# Patient Record
Sex: FEMALE | Race: WHITE | NOT HISPANIC OR LATINO | Employment: UNEMPLOYED | URBAN - METROPOLITAN AREA
[De-identification: names, ages, dates, MRNs, and addresses within clinical notes are randomized per-mention and may not be internally consistent; named-entity substitution may affect disease eponyms.]

---

## 2017-03-03 ENCOUNTER — HOSPITAL ENCOUNTER (EMERGENCY)
Facility: MEDICAL CENTER | Age: 22
End: 2017-03-03

## 2017-03-03 VITALS
TEMPERATURE: 99.1 F | DIASTOLIC BLOOD PRESSURE: 53 MMHG | HEIGHT: 63 IN | BODY MASS INDEX: 22.27 KG/M2 | HEART RATE: 96 BPM | RESPIRATION RATE: 16 BRPM | WEIGHT: 125.66 LBS | SYSTOLIC BLOOD PRESSURE: 112 MMHG | OXYGEN SATURATION: 100 %

## 2017-03-03 PROCEDURE — 302449 STATCHG TRIAGE ONLY (STATISTIC)

## 2017-03-03 ASSESSMENT — PAIN SCALES - GENERAL: PAINLEVEL_OUTOF10: 0

## 2017-03-03 NOTE — ED NOTES
C/o having seizure this morning, has a hx of same, has been on keppra in the past, but quit because it ' just made me feel down', does not want to take anticonvulsants, wants to get on medical marajuana fo, seizures increasing last keppra 5 years ago, is experiencing ringing in L ear, has used ativan because she gets auras and can premedicate

## 2017-07-26 ENCOUNTER — HOSPITAL ENCOUNTER (EMERGENCY)
Facility: MEDICAL CENTER | Age: 22
End: 2017-07-26
Attending: EMERGENCY MEDICINE
Payer: MEDICAID

## 2017-07-26 ENCOUNTER — APPOINTMENT (OUTPATIENT)
Dept: RADIOLOGY | Facility: MEDICAL CENTER | Age: 22
End: 2017-07-26
Attending: EMERGENCY MEDICINE
Payer: MEDICAID

## 2017-07-26 VITALS
HEIGHT: 64 IN | SYSTOLIC BLOOD PRESSURE: 124 MMHG | OXYGEN SATURATION: 99 % | TEMPERATURE: 97.9 F | DIASTOLIC BLOOD PRESSURE: 48 MMHG | HEART RATE: 62 BPM | WEIGHT: 120 LBS | BODY MASS INDEX: 20.49 KG/M2 | RESPIRATION RATE: 18 BRPM

## 2017-07-26 DIAGNOSIS — E87.6 HYPOKALEMIA: ICD-10-CM

## 2017-07-26 DIAGNOSIS — Z86.69 HISTORY OF SEIZURE DISORDER: ICD-10-CM

## 2017-07-26 DIAGNOSIS — N39.0 URINARY TRACT INFECTION WITHOUT HEMATURIA, SITE UNSPECIFIED: ICD-10-CM

## 2017-07-26 DIAGNOSIS — R56.9 SEIZURE (HCC): ICD-10-CM

## 2017-07-26 LAB
ALBUMIN SERPL BCP-MCNC: 3.8 G/DL (ref 3.2–4.9)
ALBUMIN/GLOB SERPL: 1.7 G/DL
ALP SERPL-CCNC: 43 U/L (ref 30–99)
ALT SERPL-CCNC: 8 U/L (ref 2–50)
ANION GAP SERPL CALC-SCNC: 5 MMOL/L (ref 0–11.9)
APPEARANCE UR: ABNORMAL
AST SERPL-CCNC: 11 U/L (ref 12–45)
BACTERIA #/AREA URNS HPF: ABNORMAL /HPF
BASOPHILS # BLD AUTO: 0.3 % (ref 0–1.8)
BASOPHILS # BLD: 0.02 K/UL (ref 0–0.12)
BILIRUB SERPL-MCNC: 0.4 MG/DL (ref 0.1–1.5)
BILIRUB UR QL STRIP.AUTO: NEGATIVE
BUN SERPL-MCNC: 15 MG/DL (ref 8–22)
CALCIUM SERPL-MCNC: 8.7 MG/DL (ref 8.5–10.5)
CHLORIDE SERPL-SCNC: 107 MMOL/L (ref 96–112)
CO2 SERPL-SCNC: 24 MMOL/L (ref 20–33)
COLOR UR: YELLOW
CREAT SERPL-MCNC: 0.6 MG/DL (ref 0.5–1.4)
CULTURE IF INDICATED INDCX: YES UA CULTURE
EOSINOPHIL # BLD AUTO: 0.29 K/UL (ref 0–0.51)
EOSINOPHIL NFR BLD: 4 % (ref 0–6.9)
EPI CELLS #/AREA URNS HPF: ABNORMAL /HPF
ERYTHROCYTE [DISTWIDTH] IN BLOOD BY AUTOMATED COUNT: 45 FL (ref 35.9–50)
GFR SERPL CREATININE-BSD FRML MDRD: >60 ML/MIN/1.73 M 2
GLOBULIN SER CALC-MCNC: 2.3 G/DL (ref 1.9–3.5)
GLUCOSE SERPL-MCNC: 85 MG/DL (ref 65–99)
GLUCOSE UR STRIP.AUTO-MCNC: NEGATIVE MG/DL
HCG UR QL: NEGATIVE
HCT VFR BLD AUTO: 37.7 % (ref 37–47)
HGB BLD-MCNC: 13 G/DL (ref 12–16)
HYALINE CASTS #/AREA URNS LPF: ABNORMAL /LPF
IMM GRANULOCYTES # BLD AUTO: 0.01 K/UL (ref 0–0.11)
IMM GRANULOCYTES NFR BLD AUTO: 0.1 % (ref 0–0.9)
KETONES UR STRIP.AUTO-MCNC: NEGATIVE MG/DL
LEUKOCYTE ESTERASE UR QL STRIP.AUTO: ABNORMAL
LYMPHOCYTES # BLD AUTO: 2.87 K/UL (ref 1–4.8)
LYMPHOCYTES NFR BLD: 39.7 % (ref 22–41)
MCH RBC QN AUTO: 33 PG (ref 27–33)
MCHC RBC AUTO-ENTMCNC: 34.5 G/DL (ref 33.6–35)
MCV RBC AUTO: 95.7 FL (ref 81.4–97.8)
MICRO URNS: ABNORMAL
MONOCYTES # BLD AUTO: 0.46 K/UL (ref 0–0.85)
MONOCYTES NFR BLD AUTO: 6.4 % (ref 0–13.4)
NEUTROPHILS # BLD AUTO: 3.58 K/UL (ref 2–7.15)
NEUTROPHILS NFR BLD: 49.5 % (ref 44–72)
NITRITE UR QL STRIP.AUTO: NEGATIVE
NRBC # BLD AUTO: 0 K/UL
NRBC BLD AUTO-RTO: 0 /100 WBC
PH UR STRIP.AUTO: 7.5 [PH]
PLATELET # BLD AUTO: 198 K/UL (ref 164–446)
PMV BLD AUTO: 10.9 FL (ref 9–12.9)
POTASSIUM SERPL-SCNC: 3.2 MMOL/L (ref 3.6–5.5)
PROT SERPL-MCNC: 6.1 G/DL (ref 6–8.2)
PROT UR QL STRIP: NEGATIVE MG/DL
RBC # BLD AUTO: 3.94 M/UL (ref 4.2–5.4)
RBC # URNS HPF: ABNORMAL /HPF
RBC UR QL AUTO: ABNORMAL
SODIUM SERPL-SCNC: 136 MMOL/L (ref 135–145)
SP GR UR REFRACTOMETRY: 1.01
SP GR UR STRIP.AUTO: 1.02
UROBILINOGEN UR STRIP.AUTO-MCNC: 1 MG/DL
WBC # BLD AUTO: 7.2 K/UL (ref 4.8–10.8)
WBC #/AREA URNS HPF: ABNORMAL /HPF

## 2017-07-26 PROCEDURE — 85025 COMPLETE CBC W/AUTO DIFF WBC: CPT

## 2017-07-26 PROCEDURE — 81001 URINALYSIS AUTO W/SCOPE: CPT

## 2017-07-26 PROCEDURE — A9270 NON-COVERED ITEM OR SERVICE: HCPCS | Performed by: EMERGENCY MEDICINE

## 2017-07-26 PROCEDURE — 70450 CT HEAD/BRAIN W/O DYE: CPT

## 2017-07-26 PROCEDURE — 81025 URINE PREGNANCY TEST: CPT

## 2017-07-26 PROCEDURE — 80053 COMPREHEN METABOLIC PANEL: CPT

## 2017-07-26 PROCEDURE — 99284 EMERGENCY DEPT VISIT MOD MDM: CPT

## 2017-07-26 PROCEDURE — 700102 HCHG RX REV CODE 250 W/ 637 OVERRIDE(OP): Performed by: EMERGENCY MEDICINE

## 2017-07-26 PROCEDURE — 87086 URINE CULTURE/COLONY COUNT: CPT

## 2017-07-26 RX ORDER — CEFDINIR 300 MG/1
300 CAPSULE ORAL ONCE
Status: COMPLETED | OUTPATIENT
Start: 2017-07-26 | End: 2017-07-26

## 2017-07-26 RX ORDER — CEFDINIR 300 MG/1
300 CAPSULE ORAL 2 TIMES DAILY
Qty: 9 CAP | Refills: 0 | Status: SHIPPED | OUTPATIENT
Start: 2017-07-26 | End: 2017-07-31

## 2017-07-26 RX ADMIN — CEFDINIR 300 MG: 300 CAPSULE ORAL at 06:11

## 2017-07-26 ASSESSMENT — ENCOUNTER SYMPTOMS
NECK PAIN: 1
SEIZURES: 1
FEVER: 0
ABDOMINAL PAIN: 0
COUGH: 1
HEADACHES: 0
VOMITING: 0
NAUSEA: 0

## 2017-07-26 ASSESSMENT — PAIN SCALES - GENERAL: PAINLEVEL_OUTOF10: 2

## 2017-07-26 ASSESSMENT — LIFESTYLE VARIABLES: DO YOU DRINK ALCOHOL: NO
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
## 2017-07-26 NOTE — ED AVS SNAPSHOT
7/26/2017    Mica Hawkins  [address]  [facility] [address]    Dear Mica:    [facility] wants to ensure your discharge home is safe and you or your loved ones have had all of your questions answered regarding your care after you leave the hospital.    Below is a list of resources and contact information should you have any questions regarding your hospital stay, follow-up instructions, or active medical symptoms.    Questions or Concerns Regarding… Contact   Medical Questions Related to Your Discharge  (7 days a week, 8am-5pm) Contact a Nurse Care Coordinator   [phone]   Medical Questions Not Related to Your Discharge  (24 hours a day / 7 days a week)  Contact the Nurse Health Line   [phone]    Medications or Discharge Instructions Refer to your discharge packet   or contact your [facility] Primary Care Provider   [phone]   Follow-up Appointment(s) Schedule your appointment via [employer]   or contact Scheduling [phone]   Billing Review your statement via [employer]  or contact Billing [phone]   Medical Records Review your records via [employer]   or contact Medical Records [phone]     You may receive a telephone call within two days of discharge. This call is to make certain you understand your discharge instructions and have the opportunity to have any questions answered. You can also easily access your medical information, test results and upcoming appointments via the [employer] free online health management tool. You can learn more and sign up at [employer]. For assistance setting up your [employer] account, please call [phone].    Once again, we want to ensure your discharge home is safe and that you have a clear understanding of any next steps in your care. If you have any questions or concerns, please do not hesitate to contact us, we are here for you. Thank you for choosing [facility] for your healthcare needs.    Sincerely,    Your [facility] Healthcare Team

## 2017-07-26 NOTE — DISCHARGE INSTRUCTIONS
Potassium Content of Foods  Potassium is a mineral found in many foods and drinks. It helps keep fluids and minerals balanced in your body and affects how steadily your heart beats. Potassium also helps control your blood pressure and keep your muscles and nervous system healthy.  Certain health conditions and medicines may change the balance of potassium in your body. When this happens, you can help balance your level of potassium through the foods that you do or do not eat. Your health care provider or dietitian may recommend an amount of potassium that you should have each day. The following lists of foods provide the amount of potassium (in parentheses) per serving in each item.  HIGH IN POTASSIUM   The following foods and beverages have 200 mg or more of potassium per serving:  · Apricots, 2 raw or 5 dry (200 mg).  · Artichoke, 1 medium (345 mg).  · Avocado, raw,  ¼ each (245 mg).  · Banana, 1 medium (425 mg).  · Beans, lima, or baked beans, canned, ½ cup (280 mg).  · Beans, white, canned, ½ cup (595 mg).  · Beef roast, 3 oz (320 mg).  · Beef, ground, 3 oz (270 mg).  · Beets, raw or cooked, ½ cup (260 mg).  · Bran muffin, 2 oz (300 mg).  · Broccoli, ½ cup (230 mg).  · [place] sprouts, ½ cup (250 mg).  · Cantaloupe, ½ cup (215 mg).  · Cereal, 100% bran, ½ cup (200-400 mg).  · Cheeseburger, single, fast food, 1 each (225-400 mg).  · Chicken, 3 oz (220 mg).  · Clams, canned, 3 oz (535 mg).  · Crab, 3 oz (225 mg).  · Dates, 5 each (270 mg).  · Dried beans and peas, ½ cup (300-475 mg).  · Figs, dried, 2 each (260 mg).  · Fish: halibut, tuna, cod, snapper, 3 oz (480 mg).  · Fish: salmon, bari, swordfish, perch, 3 oz (300 mg).  · Fish, tuna, canned 3 oz (200 mg).  · French fries, fast food, 3 oz (470 mg).  · Granola with fruit and nuts, ½ cup (200 mg).  · Grapefruit juice, ½ cup (200 mg).  · Greens, beet, ½ cup (655 mg).  · Honeydew melon, ½ cup (200 mg).  · Kale, raw, 1 cup (300 mg).  · Kiwi, 1 medium (240  mg).  · Kohlrabi, rutabaga, parsnips, ½ cup (280 mg).  · Lentils, ½ cup (365 mg).  · [place], 1 each (325 mg).  · Milk, chocolate, 1 cup (420 mg).  · Milk: nonfat, low-fat, whole, buttermilk, 1 cup (350-380 mg).  · Molasses, 1 Tbsp (295 mg).  · Mushrooms, ½ cup (280) mg.  · Nectarine, 1 each (275 mg).  · Nuts: almonds, peanuts, hazelnuts, [place], cashew, mixed, 1 oz (200 mg).  · Nuts, pistachios, 1 oz (295 mg).  · Orange, 1 each (240 mg).  · Orange juice, ½ cup (235 mg).  · Papaya, medium, ½ fruit (390 mg).  · Peanut butter, chunky, 2 Tbsp (240 mg).  · Peanut butter, smooth, 2 Tbsp (210 mg).  · Pear, 1 medium (200 mg).  · Pomegranate, 1 whole (400 mg).  · Pomegranate juice, ½ cup (215 mg).  · Pork, 3 oz (350 mg).  · Potato chips, salted, 1 oz (465 mg).  · Potato, baked with skin, 1 medium (925 mg).  · Potatoes, boiled, ½ cup (255 mg).  · Potatoes, mashed, ½ cup (330 mg).  · Prune juice, ½ cup (370 mg).  · Prunes, 5 each (305 mg).  · Pudding, chocolate, ½ cup (230 mg).  · Pumpkin, canned, ½ cup (250 mg).  · Raisins, seedless, ¼ cup (270 mg).  · Seeds, sunflower or pumpkin, 1 oz (240 mg).  · Soy milk, 1 cup (300 mg).  · Spinach, ½ cup (420 mg).  · Spinach, canned, ½ cup (370 mg).  · Sweet potato, baked with skin, 1 medium (450 mg).  · Swiss chard, ½ cup (480 mg).  · Tomato or vegetable juice, ½ cup (275 mg).  · Tomato sauce or puree, ½ cup (400-550 mg).  · Tomato, raw, 1 medium (290 mg).  · Tomatoes, canned, ½ cup (200-300 mg).  · Turkey, 3 oz (250 mg).  · Wheat germ, 1 oz (250 mg).  · Winter squash, ½ cup (250 mg).  · Yogurt, plain or fruited, 6 oz (260-435 mg).  · Zucchini, ½ cup (220 mg).  MODERATE IN POTASSIUM  The following foods and beverages have  mg of potassium per serving:  · Apple, 1 each (150 mg).  · Apple juice, ½ cup (150 mg).  · Applesauce, ½ cup (90 mg).  · Apricot nectar, ½ cup (140 mg).  · Asparagus, small mason, ½ cup or 6 mason (155 mg).  · Bagel, cinnamon raisin, 1 each (130 mg).  · Bagel,  egg or plain, 4 in., 1 each (70 mg).  · Beans, green, ½ cup (90 mg).  · Beans, yellow, ½ cup (190 mg).  · Beer, regular, 12 oz (100 mg).  · Beets, canned, ½ cup (125 mg).  · Blackberries, ½ cup (115 mg).  · Blueberries, ½ cup (60 mg).  · Bread, whole wheat, 1 slice (70 mg).  · Broccoli, raw, ½ cup (145 mg).  · Cabbage, ½ cup (150 mg).  · Carrots, cooked or raw, ½ cup (180 mg).  · Cauliflower, raw, ½ cup (150 mg).  · Celery, raw, ½ cup (155 mg).  · Cereal, bran flakes, ½cup (120-150 mg).  · Cheese, cottage, ½ cup (110 mg).  · Cherries, 10 each (150 mg).  · Chocolate, 1½ oz bar (165 mg).  · Coffee, brewed 6 oz (90 mg).  · Corn, ½ cup or 1 ear (195 mg).  · Cucumbers, ½ cup (80 mg).  · Egg, large, 1 each (60 mg).  · Eggplant, ½ cup (60 mg).  · Endive, raw, ½cup (80 mg).  · English muffin, 1 each (65 mg).  · Fish, orange roughy, 3 oz (150 mg).  · Frankfurter, beef or pork, 1 each (75 mg).  · Fruit cocktail, ½ cup (115 mg).  · Grape juice, ½ cup (170 mg).  · Grapefruit, ½ fruit (175 mg).  · Grapes, ½ cup (155 mg).  · Greens: kale, turnip, julio, ½ cup (110-150 mg).  · Ice cream or frozen yogurt, chocolate, ½ cup (175 mg).  · Ice cream or frozen yogurt, vanilla, ½ cup (120-150 mg).  · [place], limes, 1 each (80 mg).  · Lettuce, all types, 1 cup (100 mg).  · Mixed vegetables, ½ cup (150 mg).  · Mushrooms, raw, ½ cup (110 mg).  · Nuts: walnuts, pecans, or macadamia, 1 oz (125 mg).  · Oatmeal, ½ cup (80 mg).  · Okra, ½ cup (110 mg).  · Onions, raw, ½ cup (120 mg).  · Peach, 1 each (185 mg).  · Peaches, canned, ½ cup (120 mg).  · Pears, canned, ½ cup (120 mg).  · Peas, green, frozen, ½ cup (90 mg).  · Peppers, green, ½ cup (130 mg).  · Peppers, red, ½ cup (160 mg).  · Pineapple juice, ½ cup (165 mg).  · Pineapple, fresh or canned, ½ cup (100 mg).  · Plums, 1 each (105 mg).  · Pudding, vanilla, ½ cup (150 mg).  · Raspberries, ½ cup (90 mg).  · Rhubarb, ½ cup (115 mg).  · Rice, wild, ½ cup (80 mg).  · Shrimp, 3 oz (155  mg).  · Spinach, raw, 1 cup (170 mg).  · Strawberries, ½ cup (125 mg).  · Summer squash ½ cup (175-200 mg).  · Swiss chard, raw, 1 cup (135 mg).  · Tangerines, 1 each (140 mg).  · Tea, brewed, 6 oz (65 mg).  · Turnips, ½ cup (140 mg).  · Watermelon, ½ cup (85 mg).  · Wine, red, table, 5 oz (180 mg).  · Wine, white, table, 5 oz (100 mg).  LOW IN POTASSIUM  The following foods and beverages have less than 50 mg of potassium per serving.  · Bread, white, 1 slice (30 mg).  · Carbonated beverages, 12 oz (less than 5 mg).  · Cheese, 1 oz (20-30 mg).  · Cranberries, ½ cup (45 mg).  · Cranberry juice cocktail, ½ cup (20 mg).  · Fats and oils, 1 Tbsp (less than 5 mg).  · Hummus, 1 Tbsp (32 mg).  · Nectar: papaya, jimmy, or pear, ½ cup (35 mg).  · Rice, white or brown, ½ cup (50 mg).  · Spaghetti or macaroni, ½ cup cooked (30 mg).  · Tortilla, flour or corn, 1 each (50 mg).  · Waffle, 4 in., 1 each (50 mg).  · Water chestnuts, ½ cup (40 mg).     This information is not intended to replace advice given to you by your health care provider. Make sure you discuss any questions you have with your health care provider.     Document Released: 08/01/2006 Document Revised: 12/23/2014 Document Reviewed: 11/14/2014  [employer] Interactive Patient Education ©2016 [employer]      Urinary Tract Infection  Urinary tract infections (UTIs) can develop anywhere along your urinary tract. Your urinary tract is your body's drainage system for removing wastes and extra water. Your urinary tract includes two kidneys, two ureters, a bladder, and a urethra. Your kidneys are a pair of bean-shaped organs. Each kidney is about the size of your fist. They are located below your ribs, one on each side of your spine.  CAUSES  Infections are caused by microbes, which are microscopic organisms, including fungi, viruses, and bacteria. These organisms are so small that they can only be seen through a microscope. Bacteria are the microbes that most commonly  cause UTIs.  SYMPTOMS   Symptoms of UTIs may vary by age and gender of the patient and by the location of the infection. Symptoms in young women typically include a frequent and intense urge to urinate and a painful, burning feeling in the bladder or urethra during urination. Older women and men are more likely to be tired, shaky, and weak and have muscle aches and abdominal pain. A fever may mean the infection is in your kidneys. Other symptoms of a kidney infection include pain in your back or sides below the ribs, nausea, and vomiting.  DIAGNOSIS  To diagnose a UTI, your caregiver will ask you about your symptoms. Your caregiver also will ask to provide a urine sample. The urine sample will be tested for bacteria and white blood cells. White blood cells are made by your body to help fight infection.  TREATMENT   Typically, UTIs can be treated with medication. Because most UTIs are caused by a bacterial infection, they usually can be treated with the use of antibiotics. The choice of antibiotic and length of treatment depend on your symptoms and the type of bacteria causing your infection.  HOME CARE INSTRUCTIONS  · If you were prescribed antibiotics, take them exactly as your caregiver instructs you. Finish the medication even if you feel better after you have only taken some of the medication.  · Drink enough water and fluids to keep your urine clear or pale yellow.  · Avoid caffeine, tea, and carbonated beverages. They tend to irritate your bladder.  · Empty your bladder often. Avoid holding urine for long periods of time.  · Empty your bladder before and after sexual intercourse.  · After a bowel movement, women should cleanse from front to back. Use each tissue only once.  SEEK MEDICAL CARE IF:   · You have back pain.  · You develop a fever.  · Your symptoms do not begin to resolve within 3 days.  SEEK IMMEDIATE MEDICAL CARE IF:   · You have severe back pain or lower abdominal pain.  · You develop  chills.  · You have nausea or vomiting.  · You have continued burning or discomfort with urination.  MAKE SURE YOU:   · Understand these instructions.  · Will watch your condition.  · Will get help right away if you are not doing well or get worse.     This information is not intended to replace advice given to you by your health care provider. Make sure you discuss any questions you have with your health care provider.     Document Released: 09/27/2006 Document Revised: 01/08/2016 Document Reviewed: 01/25/2013  [employer] Interactive Patient Education ©2016 [employer]    Seizure, Adult  A seizure is abnormal electrical activity in the brain. Seizures usually last from 30 seconds to 2 minutes. There are various types of seizures.  Before a seizure, you may have a warning sensation (aura) that a seizure is about to occur. An aura may include the following symptoms:   · Fear or anxiety.  · Nausea.  · Feeling like the room is spinning (vertigo).  · Vision changes, such as seeing flashing lights or spots.  Common symptoms during a seizure include:  · A change in attention or behavior (altered mental status).  · Convulsions with rhythmic jerking movements.  · Drooling.  · Rapid eye movements.  · Grunting.  · Loss of bladder and bowel control.  · Bitter taste in the mouth.  · Tongue biting.  After a seizure, you may feel confused and sleepy. You may also have an injury resulting from convulsions during the seizure.  HOME CARE INSTRUCTIONS   · If you are given medicines, take them exactly as prescribed by your health care provider.  · Keep all follow-up appointments as directed by your health care provider.  · Do not swim or drive or engage in risky activity during which a seizure could cause further injury to you or others until your health care provider says it is OK.  · Get adequate rest.  · Teach friends and family what to do if you have a seizure. They should:  · Lay you on the ground to prevent a fall.  · Put a  cushion under your head.  · Loosen any tight clothing around your neck.  · Turn you on your side. If vomiting occurs, this helps keep your airway clear.  · Stay with you until you recover.  · Know whether or not you need emergency care.  SEEK IMMEDIATE MEDICAL CARE IF:  · The seizure lasts longer than 5 minutes.  · The seizure is severe or you do not wake up immediately after the seizure.  · You have an altered mental status after the seizure.  · You are having more frequent or worsening seizures.  Someone should drive you to the emergency department or call local emergency services (911 in [place]  MAKE SURE YOU:  · Understand these instructions.  · Will watch your condition.  · Will get help right away if you are not doing well or get worse.     This information is not intended to replace advice given to you by your health care provider. Make sure you discuss any questions you have with your health care provider.     Document Released: 12/15/2001 Document Revised: 01/08/2016 Document Reviewed: 07/30/2014  [employer] Interactive Patient Education ©2016 [employer]

## 2017-07-26 NOTE — ED AVS SNAPSHOT
Home Care Instructions                                                                                                                Mica Hawkins   MRN: [id number]    Department:  [facility], Emergency Dept   Date of Visit:  7/26/2017            [facility], Emergency Dept    [address] [place] [id number]    Phone:  [phone]      You were seen by     Janet Orona D.O.      Your Diagnosis Was     Seizure (CMS-HCC)     R56.9       These are the medications you received during your hospitalization from 07/26/2017 0215 to 07/26/2017 0712     Date/Time Order Dose Route Action    07/26/2017 0611 cefdinir (OMNICEF) capsule 300 mg 300 mg Oral Given      Follow-up Information     1. Schedule an appointment as soon as possible for a visit with Kamila Dawson M.D..    Specialty:  Neurology    Contact information    [address] [place] [id number] [phone]          2. Schedule an appointment as soon as possible for a visit with [facility].    Contact information    [address] [place] [address] [phone]        3. Schedule an appointment as soon as possible for a visit with [facility].    Specialty:  Orders    Contact information    [address] [place] [id number]          4. Schedule an appointment as soon as possible for a visit with Megan Douglas M.D..    Specialty:  Family Medicine    Contact information    [address] [facility] [address] [place] [address] [phone]          5. Follow up with [facility], Emergency Dept.    Specialty:  Emergency Medicine    Why:  If symptoms worsen    Contact information    [address] [place] [address] [phone]      Medication Information     Review all of your home medications and newly ordered medications with your primary doctor and/or pharmacist as soon as possible. Follow medication instructions as directed by your doctor and/or pharmacist.     Please keep  your complete medication list with you and share with your physician. Update the information when medications are discontinued, doses are changed, or new medications (including over-the-counter products) are added; and carry medication information at all times in the event of emergency situations.               Medication List      START taking these medications        Instructions    Morning Afternoon Evening Bedtime    cefdinir 300 MG Caps   Last time this was given:  300 mg on 7/26/2017  6:11 AM   Commonly known as:  OMNICEF        Take 1 Cap by mouth 2 times a day for 5 days.   Dose:  300 mg                          ASK your doctor about these medications        Instructions    Morning Afternoon Evening Bedtime    * levetiracetam 500 MG Tb24   Commonly known as:  KEPPRA XR        Take 1 Tab by mouth 2 times a day.   Dose:  500 mg                        * levetiracetam 500 MG Tabs   Commonly known as:  KEPPRA        Take 1 Tab by mouth 2 times a day.   Dose:  500 mg                        lorazepam 1 MG Tabs   Commonly known as:  ATIVAN        Take one tablet for breakthrough seizure.  Do not exceed more than 2 tablets in 24 hours.                        * Notice:  This list has 2 medication(s) that are the same as other medications prescribed for you. Read the directions carefully, and ask your doctor or other care provider to review them with you.         Where to Get Your Medications      These medications were sent to [facility] [address] [facility]), [place] - [address]  [address]) [place] [address]     Phone:  [phone]    - cefdinir 300 MG Caps            Procedures and tests performed during your visit     BETA-HCG QUALITATIVE URINE    CBC WITH DIFFERENTIAL    COMP METABOLIC PANEL    CT-HEAD W/O    ESTIMATED GFR    IV [place]    REFRACTOMETER SG    URINALYSIS CULTURE, IF INDICATED    URINE CULTURE(NEW)    URINE MICROSCOPIC (W/UA)        Discharge Instructions       Potassium  Content of Foods  Potassium is a mineral found in many foods and drinks. It helps keep fluids and minerals balanced in your body and affects how steadily your heart beats. Potassium also helps control your blood pressure and keep your muscles and nervous system healthy.  Certain health conditions and medicines may change the balance of potassium in your body. When this happens, you can help balance your level of potassium through the foods that you do or do not eat. Your health care provider or dietitian may recommend an amount of potassium that you should have each day. The following lists of foods provide the amount of potassium (in parentheses) per serving in each item.  HIGH IN POTASSIUM   The following foods and beverages have 200 mg or more of potassium per serving:  · Apricots, 2 raw or 5 dry (200 mg).  · Artichoke, 1 medium (345 mg).  · Avocado, raw,  ¼ each (245 mg).  · Banana, 1 medium (425 mg).  · Beans, lima, or baked beans, canned, ½ cup (280 mg).  · Beans, white, canned, ½ cup (595 mg).  · Beef roast, 3 oz (320 mg).  · Beef, ground, 3 oz (270 mg).  · Beets, raw or cooked, ½ cup (260 mg).  · Bran muffin, 2 oz (300 mg).  · Broccoli, ½ cup (230 mg).  · Lagrange sprouts, ½ cup (250 mg).  · Cantaloupe, ½ cup (215 mg).  · Cereal, 100% bran, ½ cup (200-400 mg).  · Cheeseburger, single, fast food, 1 each (225-400 mg).  · Chicken, 3 oz (220 mg).  · Clams, canned, 3 oz (535 mg).  · Crab, 3 oz (225 mg).  · Dates, 5 each (270 mg).  · Dried beans and peas, ½ cup (300-475 mg).  · Figs, dried, 2 each (260 mg).  · Fish: halibut, tuna, cod, snapper, 3 oz (480 mg).  · Fish: salmon, bari, swordfish, perch, 3 oz (300 mg).  · Fish, tuna, canned 3 oz (200 mg).  · French fries, fast food, 3 oz (470 mg).  · Granola with fruit and nuts, ½ cup (200 mg).  · Grapefruit juice, ½ cup (200 mg).  · Greens, beet, ½ cup (655 mg).  · Honeydew melon, ½ cup (200 mg).  · Kale, raw, 1 cup (300 mg).  · Kiwi, 1 medium (240  mg).  · Kohlrabi, rutabaga, parsnips, ½ cup (280 mg).  · Lentils, ½ cup (365 mg).  · Dix, 1 each (325 mg).  · Milk, chocolate, 1 cup (420 mg).  · Milk: nonfat, low-fat, whole, buttermilk, 1 cup (350-380 mg).  · Molasses, 1 Tbsp (295 mg).  · Mushrooms, ½ cup (280) mg.  · Nectarine, 1 each (275 mg).  · Nuts: almonds, peanuts, hazelnuts, [place], cashew, mixed, 1 oz (200 mg).  · Nuts, pistachios, 1 oz (295 mg).  · Orange, 1 each (240 mg).  · Orange juice, ½ cup (235 mg).  · Papaya, medium, ½ fruit (390 mg).  · Peanut butter, chunky, 2 Tbsp (240 mg).  · Peanut butter, smooth, 2 Tbsp (210 mg).  · Pear, 1 medium (200 mg).  · Pomegranate, 1 whole (400 mg).  · Pomegranate juice, ½ cup (215 mg).  · Pork, 3 oz (350 mg).  · Potato chips, salted, 1 oz (465 mg).  · Potato, baked with skin, 1 medium (925 mg).  · Potatoes, boiled, ½ cup (255 mg).  · Potatoes, mashed, ½ cup (330 mg).  · Prune juice, ½ cup (370 mg).  · Prunes, 5 each (305 mg).  · Pudding, chocolate, ½ cup (230 mg).  · Pumpkin, canned, ½ cup (250 mg).  · Raisins, seedless, ¼ cup (270 mg).  · Seeds, sunflower or pumpkin, 1 oz (240 mg).  · Soy milk, 1 cup (300 mg).  · Spinach, ½ cup (420 mg).  · Spinach, canned, ½ cup (370 mg).  · Sweet potato, baked with skin, 1 medium (450 mg).  · Swiss chard, ½ cup (480 mg).  · Tomato or vegetable juice, ½ cup (275 mg).  · Tomato sauce or puree, ½ cup (400-550 mg).  · Tomato, raw, 1 medium (290 mg).  · Tomatoes, canned, ½ cup (200-300 mg).  · Turkey, 3 oz (250 mg).  · Wheat germ, 1 oz (250 mg).  · Winter squash, ½ cup (250 mg).  · Yogurt, plain or fruited, 6 oz (260-435 mg).  · Zucchini, ½ cup (220 mg).  MODERATE IN POTASSIUM  The following foods and beverages have  mg of potassium per serving:  · Apple, 1 each (150 mg).  · Apple juice, ½ cup (150 mg).  · Applesauce, ½ cup (90 mg).  · Apricot nectar, ½ cup (140 mg).  · Asparagus, small mason, ½ cup or 6 mason (155 mg).  · Bagel, cinnamon raisin, 1 each (130 mg).  · Bagel,  egg or plain, 4 in., 1 each (70 mg).  · Beans, green, ½ cup (90 mg).  · Beans, yellow, ½ cup (190 mg).  · Beer, regular, 12 oz (100 mg).  · Beets, canned, ½ cup (125 mg).  · Blackberries, ½ cup (115 mg).  · Blueberries, ½ cup (60 mg).  · Bread, whole wheat, 1 slice (70 mg).  · Broccoli, raw, ½ cup (145 mg).  · Cabbage, ½ cup (150 mg).  · Carrots, cooked or raw, ½ cup (180 mg).  · Cauliflower, raw, ½ cup (150 mg).  · Celery, raw, ½ cup (155 mg).  · Cereal, bran flakes, ½cup (120-150 mg).  · Cheese, cottage, ½ cup (110 mg).  · Cherries, 10 each (150 mg).  · Chocolate, 1½ oz bar (165 mg).  · Coffee, brewed 6 oz (90 mg).  · Corn, ½ cup or 1 ear (195 mg).  · Cucumbers, ½ cup (80 mg).  · Egg, large, 1 each (60 mg).  · Eggplant, ½ cup (60 mg).  · Endive, raw, ½cup (80 mg).  · English muffin, 1 each (65 mg).  · Fish, orange roughy, 3 oz (150 mg).  · Frankfurter, beef or pork, 1 each (75 mg).  · Fruit cocktail, ½ cup (115 mg).  · Grape juice, ½ cup (170 mg).  · Grapefruit, ½ fruit (175 mg).  · Grapes, ½ cup (155 mg).  · Greens: kale, turnip, julio, ½ cup (110-150 mg).  · Ice cream or frozen yogurt, chocolate, ½ cup (175 mg).  · Ice cream or frozen yogurt, vanilla, ½ cup (120-150 mg).  · Carmita, limes, 1 each (80 mg).  · Lettuce, all types, 1 cup (100 mg).  · Mixed vegetables, ½ cup (150 mg).  · Mushrooms, raw, ½ cup (110 mg).  · Nuts: walnuts, pecans, or macadamia, 1 oz (125 mg).  · Oatmeal, ½ cup (80 mg).  · Okra, ½ cup (110 mg).  · Onions, raw, ½ cup (120 mg).  · Peach, 1 each (185 mg).  · Peaches, canned, ½ cup (120 mg).  · Pears, canned, ½ cup (120 mg).  · Peas, green, frozen, ½ cup (90 mg).  · Peppers, green, ½ cup (130 mg).  · Peppers, red, ½ cup (160 mg).  · Pineapple juice, ½ cup (165 mg).  · Pineapple, fresh or canned, ½ cup (100 mg).  · Plums, 1 each (105 mg).  · Pudding, vanilla, ½ cup (150 mg).  · Raspberries, ½ cup (90 mg).  · Rhubarb, ½ cup (115 mg).  · Rice, wild, ½ cup (80 mg).  · Shrimp, 3 oz (155  mg).  · Spinach, raw, 1 cup (170 mg).  · Strawberries, ½ cup (125 mg).  · Summer squash ½ cup (175-200 mg).  · Swiss chard, raw, 1 cup (135 mg).  · Tangerines, 1 each (140 mg).  · Tea, brewed, 6 oz (65 mg).  · Turnips, ½ cup (140 mg).  · Watermelon, ½ cup (85 mg).  · Wine, red, table, 5 oz (180 mg).  · Wine, white, table, 5 oz (100 mg).  LOW IN POTASSIUM  The following foods and beverages have less than 50 mg of potassium per serving.  · Bread, white, 1 slice (30 mg).  · Carbonated beverages, 12 oz (less than 5 mg).  · Cheese, 1 oz (20-30 mg).  · Cranberries, ½ cup (45 mg).  · Cranberry juice cocktail, ½ cup (20 mg).  · Fats and oils, 1 Tbsp (less than 5 mg).  · Hummus, 1 Tbsp (32 mg).  · Nectar: papaya, jimmy, or pear, ½ cup (35 mg).  · Rice, white or brown, ½ cup (50 mg).  · Spaghetti or macaroni, ½ cup cooked (30 mg).  · Tortilla, flour or corn, 1 each (50 mg).  · Waffle, 4 in., 1 each (50 mg).  · Water chestnuts, ½ cup (40 mg).     This information is not intended to replace advice given to you by your health care provider. Make sure you discuss any questions you have with your health care provider.     Document Released: 08/01/2006 Document Revised: 12/23/2014 Document Reviewed: 11/14/2014  [employer] Interactive Interactive Patient Education ©2016 [employer]      Urinary Tract Infection  Urinary tract infections (UTIs) can develop anywhere along your urinary tract. Your urinary tract is your body's drainage system for removing wastes and extra water. Your urinary tract includes two kidneys, two ureters, a bladder, and a urethra. Your kidneys are a pair of bean-shaped organs. Each kidney is about the size of your fist. They are located below your ribs, one on each side of your spine.  CAUSES  Infections are caused by microbes, which are microscopic organisms, including fungi, viruses, and bacteria. These organisms are so small that they can only be seen through a microscope. Bacteria are the microbes that most commonly  cause UTIs.  SYMPTOMS   Symptoms of UTIs may vary by age and gender of the patient and by the location of the infection. Symptoms in young women typically include a frequent and intense urge to urinate and a painful, burning feeling in the bladder or urethra during urination. Older women and men are more likely to be tired, shaky, and weak and have muscle aches and abdominal pain. A fever may mean the infection is in your kidneys. Other symptoms of a kidney infection include pain in your back or sides below the ribs, nausea, and vomiting.  DIAGNOSIS  To diagnose a UTI, your caregiver will ask you about your symptoms. Your caregiver also will ask to provide a urine sample. The urine sample will be tested for bacteria and white blood cells. White blood cells are made by your body to help fight infection.  TREATMENT   Typically, UTIs can be treated with medication. Because most UTIs are caused by a bacterial infection, they usually can be treated with the use of antibiotics. The choice of antibiotic and length of treatment depend on your symptoms and the type of bacteria causing your infection.  HOME CARE INSTRUCTIONS  · If you were prescribed antibiotics, take them exactly as your caregiver instructs you. Finish the medication even if you feel better after you have only taken some of the medication.  · Drink enough water and fluids to keep your urine clear or pale yellow.  · Avoid caffeine, tea, and carbonated beverages. They tend to irritate your bladder.  · Empty your bladder often. Avoid holding urine for long periods of time.  · Empty your bladder before and after sexual intercourse.  · After a bowel movement, women should cleanse from front to back. Use each tissue only once.  SEEK MEDICAL CARE IF:   · You have back pain.  · You develop a fever.  · Your symptoms do not begin to resolve within 3 days.  SEEK IMMEDIATE MEDICAL CARE IF:   · You have severe back pain or lower abdominal pain.  · You develop  chills.  · You have nausea or vomiting.  · You have continued burning or discomfort with urination.  MAKE SURE YOU:   · Understand these instructions.  · Will watch your condition.  · Will get help right away if you are not doing well or get worse.     This information is not intended to replace advice given to you by your health care provider. Make sure you discuss any questions you have with your health care provider.     Document Released: 09/27/2006 Document Revised: 01/08/2016 Document Reviewed: 01/25/2013  [employer] Interactive Patient Education ©2016 [employer]    Seizure, Adult  A seizure is abnormal electrical activity in the brain. Seizures usually last from 30 seconds to 2 minutes. There are various types of seizures.  Before a seizure, you may have a warning sensation (aura) that a seizure is about to occur. An aura may include the following symptoms:   · Fear or anxiety.  · Nausea.  · Feeling like the room is spinning (vertigo).  · Vision changes, such as seeing flashing lights or spots.  Common symptoms during a seizure include:  · A change in attention or behavior (altered mental status).  · Convulsions with rhythmic jerking movements.  · Drooling.  · Rapid eye movements.  · Grunting.  · Loss of bladder and bowel control.  · Bitter taste in the mouth.  · Tongue biting.  After a seizure, you may feel confused and sleepy. You may also have an injury resulting from convulsions during the seizure.  HOME CARE INSTRUCTIONS   · If you are given medicines, take them exactly as prescribed by your health care provider.  · Keep all follow-up appointments as directed by your health care provider.  · Do not swim or drive or engage in risky activity during which a seizure could cause further injury to you or others until your health care provider says it is OK.  · Get adequate rest.  · Teach friends and family what to do if you have a seizure. They should:  · Lay you on the ground to prevent a fall.  · Put a  cushion under your head.  · Loosen any tight clothing around your neck.  · Turn you on your side. If vomiting occurs, this helps keep your airway clear.  · Stay with you until you recover.  · Know whether or not you need emergency care.  SEEK IMMEDIATE MEDICAL CARE IF:  · The seizure lasts longer than 5 minutes.  · The seizure is severe or you do not wake up immediately after the seizure.  · You have an altered mental status after the seizure.  · You are having more frequent or worsening seizures.  Someone should drive you to the emergency department or call local emergency services (911 in [place]  MAKE SURE YOU:  · Understand these instructions.  · Will watch your condition.  · Will get help right away if you are not doing well or get worse.     This information is not intended to replace advice given to you by your health care provider. Make sure you discuss any questions you have with your health care provider.     Document Released: 12/15/2001 Document Revised: 01/08/2016 Document Reviewed: 07/30/2014  [employer] Interactive Patient Education ©2016 [employer]              Patient Information     Patient Information    Following emergency treatment: all patient requiring follow-up care must return either to a private physician or a clinic if your condition worsens before you are able to obtain further medical attention, please return to the emergency room.     Billing Information    At [facility], we work to make the billing process streamlined for our patients.  Our Representatives are here to answer any questions you may have regarding your hospital bill.  If you have insurance coverage and have supplied your insurance information to us, we will submit a claim to your insurer on your behalf.  Should you have any questions regarding your bill, we can be reached online or by phone as follows:  Online: You are able pay your bills online or live chat with our representatives about any billing questions you may  have. We are here to help Monday - Friday from 8:00am to 7:30pm and 9:00am - 12:00pm on Saturdays.  Please visit [email or web address]  for more information.   Phone:  [phone] or [phone]    Please note that your emergency physician, surgeon, pathologist, radiologist, anesthesiologist, and other specialists are not employed by [facility] and will therefore bill separately for their services.  Please contact them directly for any questions concerning their bills at the numbers below:     Emergency Physician Services:  [phone]  [facility]:  [phone]  [facility]:  [phone]  [facility]:  [phone]    1. Your final bill may vary from the amount quoted upon discharge if all procedures are not complete at that time, or if your doctor has additional procedures of which we are not aware. You will receive an additional bill if you return to the Emergency Department at [facility] for suture removal regardless of the facility of which the sutures were placed.     2. Please arrange for settlement of this account at the emergency registration.    3. All self-pay accounts are due in full at the time of treatment.  If you are unable to meet this obligation then payment is expected within 4-5 days.     4. If you have had radiology studies (CT, X-ray, Ultrasound, MRI), you have received a preliminary result during your emergency department visit. Please contact the radiology department [phone] to receive a copy of your final result. Please discuss the Final result with your primary physician or with the follow up physician provided.     Crisis Hotline:  [place] Crisis Hotline:  1-259-XYBGMHD or 1-852.648.6181  [place] Crisis Hotline:    1-712.662.5924 or 441-467-3991         ED Discharge Follow Up Questions    1. In order to provide you with very good care, we would like to follow up with a phone call in the next few  days.  May we have your permission to contact you?     YES /  NO    2. What is the best phone number to call you? (       )_____-__________    3. What is the best time to call you?      Morning  /  Afternoon  /  Evening                   Patient Signature:  ____________________________________________________________    Date:  ____________________________________________________________

## 2017-07-26 NOTE — ED AVS SNAPSHOT
Best Five Reviewed Access Code: [email or web address]  Expires: 8/25/2017  7:12 AM    Best Five Reviewed  A secure, online tool to manage your health information     [employer]’s Best Five Reviewed® is a secure, online tool that connects you to your personalized health information from the privacy of your home -- day or night - making it very easy for you to manage your healthcare. Once the activation process is completed, you can even access your medical information using the Best Five Reviewed shirin, which is available for free in the Apple Shirin store or Google Play store.     Best Five Reviewed provides the following levels of access (as shown below):   My Chart Features   [facility] Primary Care Doctor [facility]  Specialists [facility]  Urgent  Care Non-[facility]  Primary Care  Doctor   Email your healthcare team securely and privately 24/7 X X X X   Manage appointments: schedule your next appointment; view details of past/upcoming appointments X      Request prescription refills. X      View recent personal medical records, including lab and immunizations X X X X   View health record, including health history, allergies, medications X X X X   Read reports about your outpatient visits, procedures, consult and ER notes X X X X   See your discharge summary, which is a recap of your hospital and/or ER visit that includes your diagnosis, lab results, and care plan. X X       How to register for Best Five Reviewed:  1. Go to  [email or web address].  2. Click on the Sign Up Now box, which takes you to the New Member Sign Up page. You will need to provide the following information:  a. Enter your Best Five Reviewed Access Code exactly as it appears at the top of this page. (You will not need to use this code after you’ve completed the sign-up process. If you do not sign up before the expiration date, you must request a new code.)   b. Enter your date of birth.   c. Enter your home email address.   d. Click Submit, and follow the next screen’s instructions.  3. Create a Best Five Reviewed ID. This will be your Best Five Reviewed  login ID and cannot be changed, so think of one that is secure and easy to remember.  4. Create a [employer] password. You can change your password at any time.  5. Enter your Password Reset Question and Answer. This can be used at a later time if you forget your password.   6. Enter your e-mail address. This allows you to receive e-mail notifications when new information is available in [employer].  7. Click Sign Up. You can now view your health information.    For assistance activating your [employer] account, call [phone]

## 2017-07-26 NOTE — ED NOTES
Patient was educated on discharge instructions.  Patient was informed about diagnosis, symptom management, risks, and home care instructions.  Patient verbalized understanding and signed discharge instructions. Prescriptions sent to pharmacy. Copy of discharge instructions in chart.  Patient has personal belongings and PIV removed, tip intact.

## 2017-07-28 LAB
BACTERIA UR CULT: ABNORMAL
BACTERIA UR CULT: ABNORMAL
SIGNIFICANT IND 70042: ABNORMAL
SITE SITE: ABNORMAL
SOURCE SOURCE: ABNORMAL

## 2017-07-28 NOTE — ED NOTES
ED Positive Culture Follow-up/Notification Note:    Date: 7/28/17     Patient seen in the ED on 7/26/2017 for    1. Seizure (CMS-HCC)    2. Hypokalemia    3. History of seizure disorder    4. Urinary tract infection without hematuria, site unspecified       Discharge Medication List as of 7/26/2017  7:12 AM      START taking these medications    Details   cefdinir (OMNICEF) 300 MG Cap Take 1 Cap by mouth 2 times a day for 5 days., Disp-9 Cap, R-0, Normal             Allergies: Review of patient's allergies indicates no known allergies.     Final cultures:   Results     Procedure Component Value Units Date/Time    URINE CULTURE(NEW) [[id number]]  (Abnormal) Collected:  07/26/17 0320    Order Status:  Completed Specimen Information:  Urine Updated:  07/28/17 0949     Significant Indicator POS (POS)      Source UR      Site --      Urine Culture Mixed skin pedro 10-50,000 cfu/mL (A)      Urine Culture -- (A)      Result:        Lactobacillus species  ,000 cfu/mL      URINALYSIS CULTURE, IF INDICATED [[id number]]  (Abnormal) Collected:  07/26/17 0320    Order Status:  Completed Specimen Information:  Urine Updated:  07/26/17 0341     Color Yellow      Character Cloudy (A)      Specific Gravity 1.016      Ph 7.5      Glucose Negative mg/dL      Ketones Negative mg/dL      Protein Negative mg/dL      Bilirubin Negative      Urobilinogen, Urine 1.0      Nitrite Negative      Leukocyte Esterase Large (A)      Occult Blood Small (A)      Micro Urine Req Microscopic      Culture Indicated Yes UA Culture           Plan:   Lactobacillus sp in the urine is a common urogenital colonizer and not likely a true urinary pathogen.  Cefdinir will provide good empiric coverage for urinary pathogens.     Gris Woody

## 2017-09-22 ENCOUNTER — APPOINTMENT (OUTPATIENT)
Dept: NEUROLOGY | Facility: MEDICAL CENTER | Age: 22
End: 2017-09-22
Payer: MEDICAID